# Patient Record
Sex: MALE | Race: WHITE | NOT HISPANIC OR LATINO | Employment: FULL TIME | URBAN - METROPOLITAN AREA
[De-identification: names, ages, dates, MRNs, and addresses within clinical notes are randomized per-mention and may not be internally consistent; named-entity substitution may affect disease eponyms.]

---

## 2019-06-28 ENCOUNTER — OFFICE VISIT (OUTPATIENT)
Dept: URGENT CARE | Facility: CLINIC | Age: 34
End: 2019-06-28
Payer: COMMERCIAL

## 2019-06-28 VITALS
WEIGHT: 187 LBS | HEART RATE: 120 BPM | DIASTOLIC BLOOD PRESSURE: 90 MMHG | RESPIRATION RATE: 18 BRPM | TEMPERATURE: 97.1 F | OXYGEN SATURATION: 98 % | SYSTOLIC BLOOD PRESSURE: 142 MMHG

## 2019-06-28 DIAGNOSIS — B34.9 VIRAL SYNDROME: Primary | ICD-10-CM

## 2019-06-28 PROCEDURE — 99203 OFFICE O/P NEW LOW 30 MIN: CPT | Performed by: EMERGENCY MEDICINE

## 2019-06-28 RX ORDER — ONDANSETRON 4 MG/1
4 TABLET, FILM COATED ORAL EVERY 8 HOURS PRN
Qty: 12 TABLET | Refills: 0 | Status: SHIPPED | OUTPATIENT
Start: 2019-06-28

## 2019-06-28 RX ORDER — LORAZEPAM 0.5 MG/1
TABLET ORAL
Refills: 1 | COMMUNITY
Start: 2019-05-20 | End: 2020-01-05 | Stop reason: HOSPADM

## 2019-06-28 RX ORDER — ONDANSETRON 4 MG/1
4 TABLET, ORALLY DISINTEGRATING ORAL EVERY 6 HOURS PRN
Status: DISCONTINUED | OUTPATIENT
Start: 2019-06-28 | End: 2020-01-05 | Stop reason: HOSPADM

## 2020-01-04 ENCOUNTER — APPOINTMENT (EMERGENCY)
Dept: RADIOLOGY | Facility: HOSPITAL | Age: 35
DRG: 392 | End: 2020-01-04
Payer: COMMERCIAL

## 2020-01-04 ENCOUNTER — HOSPITAL ENCOUNTER (INPATIENT)
Facility: HOSPITAL | Age: 35
LOS: 1 days | Discharge: HOME | DRG: 392 | End: 2020-01-05
Attending: EMERGENCY MEDICINE | Admitting: INTERNAL MEDICINE
Payer: COMMERCIAL

## 2020-01-04 ENCOUNTER — APPOINTMENT (EMERGENCY)
Dept: CT IMAGING | Facility: HOSPITAL | Age: 35
DRG: 392 | End: 2020-01-04
Payer: COMMERCIAL

## 2020-01-04 DIAGNOSIS — R10.32 LEFT LOWER QUADRANT ABDOMINAL PAIN: ICD-10-CM

## 2020-01-04 DIAGNOSIS — F10.230 ALCOHOL WITHDRAWAL SYNDROME WITHOUT COMPLICATION (HCC): Primary | ICD-10-CM

## 2020-01-04 DIAGNOSIS — K52.9 COLITIS: ICD-10-CM

## 2020-01-04 DIAGNOSIS — R11.0 NAUSEA: ICD-10-CM

## 2020-01-04 DIAGNOSIS — R79.89 ELEVATED LACTIC ACID LEVEL: ICD-10-CM

## 2020-01-04 DIAGNOSIS — K21.9 GERD (GASTROESOPHAGEAL REFLUX DISEASE): ICD-10-CM

## 2020-01-04 PROBLEM — F10.129 ALCOHOL ABUSE WITH INTOXICATION (HCC): Status: ACTIVE | Noted: 2020-01-04

## 2020-01-04 LAB
ALBUMIN SERPL BCP-MCNC: 3.7 G/DL (ref 3.5–5)
ALP SERPL-CCNC: 64 U/L (ref 46–116)
ALT SERPL W P-5'-P-CCNC: 48 U/L (ref 12–78)
AMPHETAMINES SERPL QL SCN: NEGATIVE
ANION GAP SERPL CALCULATED.3IONS-SCNC: 11 MMOL/L (ref 4–13)
AST SERPL W P-5'-P-CCNC: 42 U/L (ref 5–45)
BARBITURATES UR QL: NEGATIVE
BASOPHILS # BLD AUTO: 0.06 THOUSANDS/ΜL (ref 0–0.1)
BASOPHILS NFR BLD AUTO: 1 % (ref 0–1)
BENZODIAZ UR QL: NEGATIVE
BILIRUB SERPL-MCNC: 0.7 MG/DL (ref 0.2–1)
BILIRUB UR QL STRIP: NEGATIVE
BUN SERPL-MCNC: 10 MG/DL (ref 5–25)
CALCIUM SERPL-MCNC: 7.7 MG/DL (ref 8.3–10.1)
CHLORIDE SERPL-SCNC: 105 MMOL/L (ref 100–108)
CK MB SERPL-MCNC: 1.5 NG/ML (ref 0–5)
CK MB SERPL-MCNC: <1 % (ref 0–2.5)
CK SERPL-CCNC: 361 U/L (ref 39–308)
CLARITY UR: CLEAR
CO2 SERPL-SCNC: 27 MMOL/L (ref 21–32)
COCAINE UR QL: NEGATIVE
COLOR UR: YELLOW
CREAT SERPL-MCNC: 1.08 MG/DL (ref 0.6–1.3)
EOSINOPHIL # BLD AUTO: 0.07 THOUSAND/ΜL (ref 0–0.61)
EOSINOPHIL NFR BLD AUTO: 1 % (ref 0–6)
ERYTHROCYTE [DISTWIDTH] IN BLOOD BY AUTOMATED COUNT: 12.1 % (ref 11.6–15.1)
ETHANOL SERPL-MCNC: 371 MG/DL (ref 0–3)
GFR SERPL CREATININE-BSD FRML MDRD: 90 ML/MIN/1.73SQ M
GLUCOSE SERPL-MCNC: 95 MG/DL (ref 65–140)
GLUCOSE UR STRIP-MCNC: NEGATIVE MG/DL
HCT VFR BLD AUTO: 46.9 % (ref 36.5–49.3)
HGB BLD-MCNC: 16.9 G/DL (ref 12–17)
HGB UR QL STRIP.AUTO: NEGATIVE
IMM GRANULOCYTES # BLD AUTO: 0.02 THOUSAND/UL (ref 0–0.2)
IMM GRANULOCYTES NFR BLD AUTO: 0 % (ref 0–2)
INR PPP: 0.99 (ref 0.84–1.19)
KETONES UR STRIP-MCNC: ABNORMAL MG/DL
LACTATE SERPL-SCNC: 2.4 MMOL/L (ref 0.5–2)
LEUKOCYTE ESTERASE UR QL STRIP: NEGATIVE
LIPASE SERPL-CCNC: 243 U/L (ref 73–393)
LYMPHOCYTES # BLD AUTO: 2.74 THOUSANDS/ΜL (ref 0.6–4.47)
LYMPHOCYTES NFR BLD AUTO: 38 % (ref 14–44)
MAGNESIUM SERPL-MCNC: 2 MG/DL (ref 1.6–2.6)
MCH RBC QN AUTO: 31.6 PG (ref 26.8–34.3)
MCHC RBC AUTO-ENTMCNC: 36 G/DL (ref 31.4–37.4)
MCV RBC AUTO: 88 FL (ref 82–98)
METHADONE UR QL: NEGATIVE
MONOCYTES # BLD AUTO: 0.59 THOUSAND/ΜL (ref 0.17–1.22)
MONOCYTES NFR BLD AUTO: 8 % (ref 4–12)
NEUTROPHILS # BLD AUTO: 3.67 THOUSANDS/ΜL (ref 1.85–7.62)
NEUTS SEG NFR BLD AUTO: 52 % (ref 43–75)
NITRITE UR QL STRIP: NEGATIVE
NRBC BLD AUTO-RTO: 0 /100 WBCS
OPIATES UR QL SCN: NEGATIVE
PCP UR QL: NEGATIVE
PH UR STRIP.AUTO: 8 [PH]
PLATELET # BLD AUTO: 214 THOUSANDS/UL (ref 149–390)
PMV BLD AUTO: 9.2 FL (ref 8.9–12.7)
POTASSIUM SERPL-SCNC: 3.8 MMOL/L (ref 3.5–5.3)
PROT SERPL-MCNC: 7.6 G/DL (ref 6.4–8.2)
PROT UR STRIP-MCNC: NEGATIVE MG/DL
PROTHROMBIN TIME: 13.1 SECONDS (ref 11.6–14.5)
RBC # BLD AUTO: 5.34 MILLION/UL (ref 3.88–5.62)
SODIUM SERPL-SCNC: 143 MMOL/L (ref 136–145)
SP GR UR STRIP.AUTO: 1.01 (ref 1–1.03)
THC UR QL: NEGATIVE
TROPONIN I SERPL-MCNC: <0.02 NG/ML
TROPONIN I SERPL-MCNC: <0.02 NG/ML
TSH SERPL DL<=0.05 MIU/L-ACNC: 1.64 UIU/ML (ref 0.36–3.74)
UROBILINOGEN UR QL STRIP.AUTO: 0.2 E.U./DL
WBC # BLD AUTO: 7.15 THOUSAND/UL (ref 4.31–10.16)

## 2020-01-04 PROCEDURE — 74177 CT ABD & PELVIS W/CONTRAST: CPT

## 2020-01-04 PROCEDURE — 84443 ASSAY THYROID STIM HORMONE: CPT | Performed by: EMERGENCY MEDICINE

## 2020-01-04 PROCEDURE — 71046 X-RAY EXAM CHEST 2 VIEWS: CPT

## 2020-01-04 PROCEDURE — 96367 TX/PROPH/DG ADDL SEQ IV INF: CPT

## 2020-01-04 PROCEDURE — 36415 COLL VENOUS BLD VENIPUNCTURE: CPT | Performed by: EMERGENCY MEDICINE

## 2020-01-04 PROCEDURE — 81003 URINALYSIS AUTO W/O SCOPE: CPT | Performed by: EMERGENCY MEDICINE

## 2020-01-04 PROCEDURE — 83690 ASSAY OF LIPASE: CPT | Performed by: EMERGENCY MEDICINE

## 2020-01-04 PROCEDURE — 84484 ASSAY OF TROPONIN QUANT: CPT | Performed by: EMERGENCY MEDICINE

## 2020-01-04 PROCEDURE — C9113 INJ PANTOPRAZOLE SODIUM, VIA: HCPCS | Performed by: EMERGENCY MEDICINE

## 2020-01-04 PROCEDURE — 82550 ASSAY OF CK (CPK): CPT | Performed by: EMERGENCY MEDICINE

## 2020-01-04 PROCEDURE — 80307 DRUG TEST PRSMV CHEM ANLYZR: CPT | Performed by: EMERGENCY MEDICINE

## 2020-01-04 PROCEDURE — 99285 EMERGENCY DEPT VISIT HI MDM: CPT | Performed by: EMERGENCY MEDICINE

## 2020-01-04 PROCEDURE — 82553 CREATINE MB FRACTION: CPT | Performed by: EMERGENCY MEDICINE

## 2020-01-04 PROCEDURE — 80053 COMPREHEN METABOLIC PANEL: CPT | Performed by: EMERGENCY MEDICINE

## 2020-01-04 PROCEDURE — 80320 DRUG SCREEN QUANTALCOHOLS: CPT | Performed by: EMERGENCY MEDICINE

## 2020-01-04 PROCEDURE — 96365 THER/PROPH/DIAG IV INF INIT: CPT

## 2020-01-04 PROCEDURE — 96375 TX/PRO/DX INJ NEW DRUG ADDON: CPT

## 2020-01-04 PROCEDURE — 85610 PROTHROMBIN TIME: CPT | Performed by: EMERGENCY MEDICINE

## 2020-01-04 PROCEDURE — 83605 ASSAY OF LACTIC ACID: CPT | Performed by: EMERGENCY MEDICINE

## 2020-01-04 PROCEDURE — G0480 DRUG TEST DEF 1-7 CLASSES: HCPCS | Performed by: EMERGENCY MEDICINE

## 2020-01-04 PROCEDURE — 96368 THER/DIAG CONCURRENT INF: CPT

## 2020-01-04 PROCEDURE — 85025 COMPLETE CBC W/AUTO DIFF WBC: CPT | Performed by: EMERGENCY MEDICINE

## 2020-01-04 PROCEDURE — 99285 EMERGENCY DEPT VISIT HI MDM: CPT

## 2020-01-04 PROCEDURE — 93005 ELECTROCARDIOGRAM TRACING: CPT

## 2020-01-04 PROCEDURE — 83735 ASSAY OF MAGNESIUM: CPT | Performed by: EMERGENCY MEDICINE

## 2020-01-04 PROCEDURE — 99223 1ST HOSP IP/OBS HIGH 75: CPT | Performed by: NURSE PRACTITIONER

## 2020-01-04 RX ORDER — SODIUM CHLORIDE 9 MG/ML
75 INJECTION, SOLUTION INTRAVENOUS CONTINUOUS
Status: DISCONTINUED | OUTPATIENT
Start: 2020-01-04 | End: 2020-01-05 | Stop reason: HOSPADM

## 2020-01-04 RX ORDER — NICOTINE 21 MG/24HR
14 PATCH, TRANSDERMAL 24 HOURS TRANSDERMAL ONCE
Status: DISCONTINUED | OUTPATIENT
Start: 2020-01-04 | End: 2020-01-05 | Stop reason: HOSPADM

## 2020-01-04 RX ORDER — MAGNESIUM HYDROXIDE/ALUMINUM HYDROXICE/SIMETHICONE 120; 1200; 1200 MG/30ML; MG/30ML; MG/30ML
30 SUSPENSION ORAL EVERY 6 HOURS PRN
Status: DISCONTINUED | OUTPATIENT
Start: 2020-01-04 | End: 2020-01-05 | Stop reason: HOSPADM

## 2020-01-04 RX ORDER — PANTOPRAZOLE SODIUM 40 MG/1
40 INJECTION, POWDER, FOR SOLUTION INTRAVENOUS ONCE
Status: COMPLETED | OUTPATIENT
Start: 2020-01-04 | End: 2020-01-04

## 2020-01-04 RX ORDER — FOLIC ACID 1 MG/1
1 TABLET ORAL DAILY
Status: DISCONTINUED | OUTPATIENT
Start: 2020-01-05 | End: 2020-01-05 | Stop reason: HOSPADM

## 2020-01-04 RX ORDER — NICOTINE 21 MG/24HR
1 PATCH, TRANSDERMAL 24 HOURS TRANSDERMAL DAILY
Status: DISCONTINUED | OUTPATIENT
Start: 2020-01-05 | End: 2020-01-05 | Stop reason: HOSPADM

## 2020-01-04 RX ORDER — SODIUM CHLORIDE 9 MG/ML
100 INJECTION, SOLUTION INTRAVENOUS CONTINUOUS
Status: DISCONTINUED | OUTPATIENT
Start: 2020-01-04 | End: 2020-01-05 | Stop reason: HOSPADM

## 2020-01-04 RX ORDER — PANTOPRAZOLE SODIUM 40 MG/1
40 INJECTION, POWDER, FOR SOLUTION INTRAVENOUS ONCE
Status: COMPLETED | OUTPATIENT
Start: 2020-01-04 | End: 2020-01-05

## 2020-01-04 RX ORDER — MAGNESIUM SULFATE HEPTAHYDRATE 40 MG/ML
2 INJECTION, SOLUTION INTRAVENOUS ONCE
Status: COMPLETED | OUTPATIENT
Start: 2020-01-04 | End: 2020-01-04

## 2020-01-04 RX ORDER — ONDANSETRON 2 MG/ML
4 INJECTION INTRAMUSCULAR; INTRAVENOUS ONCE AS NEEDED
Status: DISCONTINUED | OUTPATIENT
Start: 2020-01-04 | End: 2020-01-04 | Stop reason: SDUPTHER

## 2020-01-04 RX ORDER — SODIUM CHLORIDE 9 MG/ML
3 INJECTION INTRAVENOUS AS NEEDED
Status: DISCONTINUED | OUTPATIENT
Start: 2020-01-04 | End: 2020-01-05 | Stop reason: HOSPADM

## 2020-01-04 RX ORDER — LORAZEPAM 2 MG/ML
1 INJECTION INTRAMUSCULAR EVERY 4 HOURS PRN
Status: DISCONTINUED | OUTPATIENT
Start: 2020-01-04 | End: 2020-01-05 | Stop reason: HOSPADM

## 2020-01-04 RX ORDER — ONDANSETRON 2 MG/ML
4 INJECTION INTRAMUSCULAR; INTRAVENOUS ONCE AS NEEDED
Status: COMPLETED | OUTPATIENT
Start: 2020-01-04 | End: 2020-01-04

## 2020-01-04 RX ORDER — ACETAMINOPHEN 325 MG/1
650 TABLET ORAL EVERY 6 HOURS PRN
Status: DISCONTINUED | OUTPATIENT
Start: 2020-01-04 | End: 2020-01-05 | Stop reason: HOSPADM

## 2020-01-04 RX ORDER — MORPHINE SULFATE 4 MG/ML
4 INJECTION, SOLUTION INTRAMUSCULAR; INTRAVENOUS ONCE
Status: COMPLETED | OUTPATIENT
Start: 2020-01-04 | End: 2020-01-04

## 2020-01-04 RX ORDER — ONDANSETRON 2 MG/ML
4 INJECTION INTRAMUSCULAR; INTRAVENOUS EVERY 6 HOURS PRN
Status: DISCONTINUED | OUTPATIENT
Start: 2020-01-04 | End: 2020-01-05 | Stop reason: HOSPADM

## 2020-01-04 RX ORDER — PANTOPRAZOLE SODIUM 40 MG/1
40 TABLET, DELAYED RELEASE ORAL
Status: DISCONTINUED | OUTPATIENT
Start: 2020-01-05 | End: 2020-01-05

## 2020-01-04 RX ORDER — THIAMINE MONONITRATE (VIT B1) 100 MG
100 TABLET ORAL DAILY
Status: DISCONTINUED | OUTPATIENT
Start: 2020-01-05 | End: 2020-01-05 | Stop reason: HOSPADM

## 2020-01-04 RX ADMIN — ONDANSETRON 4 MG: 2 INJECTION INTRAMUSCULAR; INTRAVENOUS at 21:04

## 2020-01-04 RX ADMIN — THIAMINE HYDROCHLORIDE 100 MG: 100 INJECTION, SOLUTION INTRAMUSCULAR; INTRAVENOUS at 19:26

## 2020-01-04 RX ADMIN — IOHEXOL 100 ML: 350 INJECTION, SOLUTION INTRAVENOUS at 20:08

## 2020-01-04 RX ADMIN — LORAZEPAM 1 MG: 2 INJECTION INTRAMUSCULAR; INTRAVENOUS at 20:54

## 2020-01-04 RX ADMIN — DEXTROSE 500 ML: 5 SOLUTION INTRAVENOUS at 19:26

## 2020-01-04 RX ADMIN — PANTOPRAZOLE SODIUM 40 MG: 40 INJECTION, POWDER, FOR SOLUTION INTRAVENOUS at 19:22

## 2020-01-04 RX ADMIN — MAGNESIUM SULFATE HEPTAHYDRATE 2 G: 40 INJECTION, SOLUTION INTRAVENOUS at 20:23

## 2020-01-04 RX ADMIN — FOLIC ACID 1 MG: 5 INJECTION, SOLUTION INTRAMUSCULAR; INTRAVENOUS; SUBCUTANEOUS at 19:25

## 2020-01-04 RX ADMIN — MORPHINE SULFATE 4 MG: 4 INJECTION INTRAVENOUS at 19:24

## 2020-01-04 RX ADMIN — SODIUM CHLORIDE 1000 ML: 0.9 INJECTION, SOLUTION INTRAVENOUS at 20:31

## 2020-01-04 RX ADMIN — NICOTINE 14 MG: 14 PATCH TRANSDERMAL at 21:45

## 2020-01-04 NOTE — ED PROVIDER NOTES
History  Chief Complaint   Patient presents with    Abdominal Pain     pt was drinking this day  pt with history of drinking issue  pt is with abdominal pain and discomfort  Pt does follow with GI and was last there in Nov     36year-old male presents with 1 week of intermittent lower abdominal pain and melanotic stools and 3-4 days of nausea and intermittent nonbloody nonbilious emesis and generalized weakness  Today patient states he feels quite anxious and has palpitations  Patient states that he is not a daily drinker but has periods of binge drinking which has been going on for the last 7 days  He states that when he starts dating someone that that is his trigger for drinking  Patient admits to drinking 1-2 pt of [place] daily last drink was today at 4:00 p m  Irl Genesisa Patient's neighbor and POA is at bedside, [facility] cell [phone]  Mr Vin Pavon states that he went by patient's house this afternoon patient was very inebriated  Mr Vin Pavon says that patient was in [place] in November 2019 for GI bleed, patient had EGD which showed esophageal erosion but no varices  Patient denies suicidal ideation or hallucinations  CIWA score 19  He has never had alcohol withdrawal seizures or ICU admission  Patient does not want alcohol rehabilitation because he fears losing his job as he is a   Of note, Mr Vin Pavon states that patient fell two days ago in a drunken stupor while in shower and sustained large bruise to left lateral flank        History provided by:  Patient and friend   used: No    Withdrawal - Alcohol   Similar prior episodes: yes    Severity:  Severe  Onset quality:  Gradual  Duration:  2 hours  Timing:  Constant  Progression:  Worsening  Chronicity:  Recurrent  Suspected agents:  Alcohol  Associated symptoms: abdominal pain, headaches, nausea, palpitations and vomiting    Associated symptoms: no agitation, no blackouts, no bladder incontinence, no bowel incontinence, no confusion, no hallucinations, no loss of consciousness, no seizures, no shortness of breath, no somnolence, no suicidal ideation, no violence and no weakness    Abdominal pain:     Location:  Generalized    Quality: cramping      Severity:  Mild    Onset quality:  Gradual    Duration:  1 week    Timing:  Intermittent    Progression:  Waxing and waning    Chronicity:  Recurrent  Risk factors: withdrawal syndrome        Prior to Admission Medications   Prescriptions Last Dose Informant Patient Reported? Taking? 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    Known health problems: none        Past Surgical History:   Procedure Laterality Date    KNEE ARTHROSCOPY         Family History   Problem Relation Age of Onset    Dementia Mother     Pulmonary embolism Father      I have reviewed and agree with the history as documented  Social History     Tobacco Use    Smoking status: Never Smoker    Smokeless tobacco: Current User     Types: Chew   Substance Use Topics    Alcohol use: Yes     Comment: daily    Drug use: Never        Review of Systems   Constitutional: Positive for diaphoresis  Negative for chills, fatigue and fever  HENT: Negative for congestion, drooling, facial swelling, nosebleeds, sneezing, trouble swallowing and voice change  Eyes: Negative for photophobia, pain and visual disturbance     Respiratory: Positive for chest tightness  Negative for cough, shortness of breath and wheezing  Cardiovascular: Positive for palpitations  Negative for chest pain and leg swelling  Gastrointestinal: Positive for abdominal pain, blood in stool, nausea and vomiting  Negative for abdominal distention, anal bleeding, bowel incontinence, constipation, diarrhea and rectal pain  Genitourinary: Negative for bladder incontinence, decreased urine volume, difficulty urinating, discharge, dysuria, flank pain, frequency, hematuria, penile pain, penile swelling, scrotal swelling, testicular pain and urgency  Musculoskeletal: Negative for arthralgias, back pain, myalgias, neck pain and neck stiffness  Skin: Negative for color change, pallor, rash and wound  Allergic/Immunologic: Negative for immunocompromised state  Neurological: Positive for headaches  Negative for dizziness, tremors, seizures, loss of consciousness, syncope, facial asymmetry, speech difficulty, weakness, light-headedness and numbness  Hematological: Negative for adenopathy  Psychiatric/Behavioral: Negative for agitation, confusion, decreased concentration, dysphoric mood, hallucinations, self-injury, sleep disturbance and suicidal ideas  The patient is nervous/anxious  The patient is not hyperactive  Physical Exam  Physical Exam   Constitutional: He is oriented to person, place, and time  He appears well-developed and well-nourished  He is cooperative  Non-toxic appearance  He does not have a sickly appearance  He does not appear ill  No distress  HENT:   Head: Normocephalic and atraumatic  Right Ear: Hearing, tympanic membrane, external ear and ear canal normal    Left Ear: Hearing, tympanic membrane, external ear and ear canal normal    Nose: Nose normal  Right sinus exhibits no maxillary sinus tenderness and no frontal sinus tenderness  Left sinus exhibits no maxillary sinus tenderness and no frontal sinus tenderness     Mouth/Throat: Uvula is midline, oropharynx is clear and moist and mucous membranes are normal  No oropharyngeal exudate, posterior oropharyngeal edema, posterior oropharyngeal erythema or tonsillar abscesses  Eyes: Pupils are equal, round, and reactive to light  Conjunctivae, EOM and lids are normal  No scleral icterus  Neck: Trachea normal, normal range of motion, full passive range of motion without pain and phonation normal  Neck supple  No thyroid mass and no thyromegaly present  Cardiovascular: Regular rhythm, S1 normal, S2 normal, normal heart sounds, intact distal pulses and normal pulses  Pulses:       Radial pulses are 2+ on the right side, and 2+ on the left side  Dorsalis pedis pulses are 2+ on the right side, and 2+ on the left side  Pulmonary/Chest: Effort normal and breath sounds normal  No accessory muscle usage or stridor  No tachypnea  No respiratory distress  He has no decreased breath sounds  He has no wheezes  He has no rhonchi  He has no rales  He exhibits no mass, no tenderness, no deformity and no retraction  Abdominal: Soft  Normal appearance and bowel sounds are normal  He exhibits no distension, no ascites and no mass  There is no hepatosplenomegaly  There is tenderness in the suprapubic area and left lower quadrant  There is no rigidity, no rebound, no guarding, no CVA tenderness, no tenderness at McBurney's point and negative Diggs's sign  No hernia  Hernia confirmed negative in the right inguinal area and confirmed negative in the left inguinal area  Area of ecchymosis on left lateral flank   Genitourinary: Testes normal and penis normal  Cremasteric reflex is present  Right testis shows no mass, no swelling and no tenderness  Left testis shows no mass, no swelling and no tenderness  Circumcised  No penile tenderness  Musculoskeletal: Normal range of motion  He exhibits no edema, tenderness or deformity  Lymphadenopathy:     He has no cervical adenopathy   No inguinal adenopathy noted on the right or left side  Neurological: He is alert and oriented to person, place, and time  He has normal strength  He is not disoriented  He displays no atrophy and no tremor  No cranial nerve deficit or sensory deficit  He exhibits normal muscle tone  He displays a negative Romberg sign  He displays no seizure activity  Coordination and gait normal  GCS eye subscore is 4  GCS verbal subscore is 5  GCS motor subscore is 6  Patient is AAOx4, GCS 15; speaking clearly and appropriately; motor and sensation intact; visual fields intact; cranial nerves II-XII grossly intact; no facial droop, slurred speech or arm drift   Skin: Skin is warm and intact  Capillary refill takes less than 2 seconds  Ecchymosis (Left flank) noted  No abrasion, no bruising, no burn, no laceration, no lesion, no petechiae and no rash noted  Rash is not maculopapular  He is diaphoretic  No cyanosis or erythema  No pallor  Psychiatric: His speech is normal and behavior is normal  Judgment and thought content normal  His mood appears anxious  He is not actively hallucinating  Cognition and memory are normal  He is attentive  Nursing note and vitals reviewed        Vital Signs  ED Triage Vitals   Temp Pulse Respirations Blood Pressure SpO2   -- 01/04/20 1812 01/04/20 1812 01/04/20 1812 01/04/20 1812    (!) 107 16 138/96 97 %      Temp src Heart Rate Source Patient Position - Orthostatic VS BP Location FiO2 (%)   -- 01/04/20 1830 01/04/20 1830 01/04/20 1830 --    Monitor Lying Right arm       Pain Score       --                  Vitals:    01/04/20 1900 01/04/20 1930 01/04/20 2015 01/04/20 2100   BP: 135/99 136/97 133/92 136/94   Pulse: 98 (!) 111 97 100   Patient Position - Orthostatic VS: Lying Lying Sitting Lying         Visual Acuity      ED Medications  Medications   sodium chloride (PF) 0 9 % injection 3 mL (has no administration in time range)   magnesium sulfate 2 g/50 mL IVPB (premix) 2 g (2 g Intravenous New Bag 1/4/20 2023) LORazepam (ATIVAN) 2 mg/mL injection 1 mg (1 mg Intravenous Given 1/4/20 2054)   nicotine (NICODERM CQ) 14 mg/24hr TD 24 hr patch 14 mg (14 mg Transdermal Medication Applied 1/4/20 2145)   morphine (PF) 4 mg/mL injection 4 mg (4 mg Intravenous Given 1/4/20 1924)   dextrose 5 % bolus 500 mL (0 mL Intravenous Stopped 1/4/20 2030)   thiamine (VITAMIN B1) 100 mg in sodium chloride 0 9 % 50 mL IVPB (0 mg Intravenous Stopped 6/6/96 4935)   folic acid 1 mg in sodium chloride 0 9 % 50 mL IVPB (0 mg Intravenous Stopped 1/4/20 1950)   pantoprazole (PROTONIX) injection 40 mg (40 mg Intravenous Given 1/4/20 1922)   iohexol (OMNIPAQUE) 350 MG/ML injection (SINGLE-DOSE) 100 mL (100 mL Intravenous Given 1/4/20 2008)   sodium chloride 0 9 % bolus 1,000 mL (0 mL Intravenous Stopped 1/4/20 2145)   ondansetron (ZOFRAN) injection 4 mg (4 mg Intravenous Given 1/4/20 2104)       Diagnostic Studies  Results Reviewed     Procedure Component Value Units Date/Time    Troponin I repeat in 3 hrs [[id number]]  (Normal) Collected:  01/04/20 2127    Lab Status:  Final result Specimen:  Blood from Arm, Left Updated:  01/04/20 2152     Troponin I <0 02 ng/mL     Rapid drug screen, urine [[id number]]  (Normal) Collected:  01/04/20 1932    Lab Status:  Final result Specimen:  Urine, Clean Catch Updated:  01/04/20 2148     Amph/Meth UR Negative     Barbiturate Ur Negative     Benzodiazepine Urine Negative     Cocaine Urine Negative     Methadone Urine Negative     Opiate Urine Negative     PCP Ur Negative     THC Urine Negative    Narrative:       FOR MEDICAL PURPOSES ONLY  IF CONFIRMATION NEEDED PLEASE CONTACT THE LAB WITHIN 5 DAYS      Drug Screen Cutoff Levels:  AMPHETAMINE/METHAMPHETAMINES  1000 ng/mL  BARBITURATES     200 ng/mL  BENZODIAZEPINES     200 ng/mL  COCAINE      300 ng/mL  METHADONE      300 ng/mL  OPIATES      300 ng/mL  PHENCYCLIDINE     25 ng/mL  THC       50 ng/mL      CKMB [[id number]]  (Normal) Collected:  01/04/20 1905    Lab Status:  Final result Specimen:  Blood from Arm, Right Updated:  01/04/20 2000     CK-MB Index <1 0 %      CK-MB 1 5 ng/mL     TSH, 3rd generation [[id number]]  (Normal) Collected:  01/04/20 1905    Lab Status:  Final result Specimen:  Blood from Arm, Right Updated:  01/04/20 1958     TSH 3RD GENERATON 1 642 uIU/mL     Narrative:       Patients undergoing fluorescein dye angiography may retain small amounts of fluorescein in the body for 48-72 hours post procedure  Samples containing fluorescein can produce falsely depressed TSH values  If the patient had this procedure,a specimen should be resubmitted post fluorescein clearance  Lactic acid, plasma [[id number]]  (Abnormal) Collected:  01/04/20 1905    Lab Status:  Final result Specimen:  Blood from Arm, Right Updated:  01/04/20 1946     LACTIC ACID 2 4 mmol/L     Narrative:       Result may be elevated if tourniquet was used during collection      Comprehensive metabolic panel [[id number]]  (Abnormal) Collected:  01/04/20 1905    Lab Status:  Final result Specimen:  Blood from Arm, Right Updated:  01/04/20 1943     Sodium 143 mmol/L      Potassium 3 8 mmol/L      Chloride 105 mmol/L      CO2 27 mmol/L      ANION GAP 11 mmol/L      BUN 10 mg/dL      Creatinine 1 08 mg/dL      Glucose 95 mg/dL      Calcium 7 7 mg/dL      AST 42 U/L      ALT 48 U/L      Alkaline Phosphatase 64 U/L      Total Protein 7 6 g/dL      Albumin 3 7 g/dL      Total Bilirubin 0 70 mg/dL      eGFR 90 ml/min/1 73sq m     Narrative:       [facility] guidelines for Chronic Kidney Disease (CKD):     Stage 1 with normal or high GFR (GFR > 90 mL/min/1 73 square meters)    Stage 2 Mild CKD (GFR = 60-89 mL/min/1 73 square meters)    Stage 3A Moderate CKD (GFR = 45-59 mL/min/1 73 square meters)    Stage 3B Moderate CKD (GFR = 30-44 mL/min/1 73 square meters)    Stage 4 Severe CKD (GFR = 15-29 mL/min/1 73 square meters)    Stage 5 End Stage CKD (GFR <15 mL/min/1 73 square meters)  Note: GFR calculation is accurate only with a steady state creatinine    Lipase [[id number]]  (Normal) Collected:  01/04/20 1905    Lab Status:  Final result Specimen:  Blood from Arm, Right Updated:  01/04/20 1943     Lipase 243 u/L     Magnesium [[id number]]  (Normal) Collected:  01/04/20 1905    Lab Status:  Final result Specimen:  Blood from Arm, Right Updated:  01/04/20 1943     Magnesium 2 0 mg/dL     UA w Reflex to Microscopic w Reflex to Culture [[id number]]  (Abnormal) Collected:  01/04/20 1932    Lab Status:  Final result Specimen:  Urine, Clean Catch Updated:  01/04/20 1938     Color, UA Yellow     Clarity, UA Clear     Specific Gravity, UA 1 015     pH, UA 8 0     Leukocytes, UA Negative     Nitrite, UA Negative     Protein, UA Negative mg/dl      Glucose, UA Negative mg/dl      Ketones, UA 15 (1+) mg/dl      Urobilinogen, UA 0 2 E U /dl      Bilirubin, UA Negative     Blood, UA Negative    Troponin I [[id number]]  (Normal) Collected:  01/04/20 1905    Lab Status:  Final result Specimen:  Blood from Arm, Right Updated:  01/04/20 1936     Troponin I <0 02 ng/mL     CK (with reflex to MB) [[id number]]  (Abnormal) Collected:  01/04/20 1905    Lab Status:  Final result Specimen:  Blood from Arm, Right Updated:  01/04/20 1936     Total  U/L     Ethanol [[id number]]  (Abnormal) Collected:  01/04/20 1905    Lab Status:  Final result Specimen:  Blood from Arm, Right Updated:  01/04/20 1931     Ethanol Lvl 371 mg/dL     Protime-INR [[id number]]  (Normal) Collected:  01/04/20 1905    Lab Status:  Final result Specimen:  Blood from Arm, Right Updated:  01/04/20 1925     Protime 13 1 seconds      INR 0 99    CBC and differential [[id number]] Collected:  01/04/20 1905    Lab Status:  Final result Specimen:  Blood from Arm, Right Updated:  01/04/20 1911     WBC 7 15 Thousand/uL      RBC 5 34 Million/uL      Hemoglobin 16 9 g/dL      Hematocrit 46 9 %      MCV 88 fL      MCH 31 6 pg      MCHC 36 0 g/dL      RDW 12 1 %      MPV 9 2 fL      Platelets 822 Thousands/uL      nRBC 0 /100 WBCs      Neutrophils Relative 52 %      Immat GRANS % 0 %      Lymphocytes Relative 38 %      Monocytes Relative 8 %      Eosinophils Relative 1 %      Basophils Relative 1 %      Neutrophils Absolute 3 67 Thousands/µL      Immature Grans Absolute 0 02 Thousand/uL      Lymphocytes Absolute 2 74 Thousands/µL      Monocytes Absolute 0 59 Thousand/µL      Eosinophils Absolute 0 07 Thousand/µL      Basophils Absolute 0 06 Thousands/µL                  CT abdomen pelvis with contrast   Final Result by Lacey Mendiola MD (01/04 2024)      Diffuse mild thickening of the ascending, proximal transverse, and rectosigmoid colon which may be due to nonspecific colitis versus underdistention  Workstation performed: [id number]         X-ray chest 2 views    (Results Pending)              Procedures  ECG 12 Lead Documentation Only  Date/Time: 1/4/2020 7:13 PM  Performed by: Paz Acosta MD  Authorized by: Paz Acosta MD     Indications / Diagnosis:  Dizziness  ECG reviewed by me, the ED Provider: yes    Patient location:  ED  Previous ECG:     Comparison to cardiac monitor: Yes    Interpretation:     Interpretation: normal    Rate:     ECG rate:  100bpm    ECG rate assessment: normal    Rhythm:     Rhythm: sinus rhythm    Ectopy:     Ectopy: none    QRS:     QRS axis:  Normal  Conduction:     Conduction: normal    ST segments:     ST segments:  Normal  T waves:     T waves: flattening and inverted      Flattening: I    Inverted:  AVR and V1  Comments:      No STEMI  SC 150ms  QRS 90ms  QT 425ms             ED Course  ED Course as of Jan 04 2215   Sat Jan 04, 2020   2004 Ethanol 371 at 1905, will be clinically sober at 0800 tomorrow morning  2022 Chest x-ray read and interpreted by me  Negative for pneumothorax, mediastinal widening, focal consolidation or pleural effusion        2036 CTAP:IMPRESSION:     Diffuse mild thickening of the ascending, proximal transverse, and rectosigmoid colon which may be due to nonspecific colitis versus underdistention             2036 Texted Whitney Kothari PA-C for admission for alcohol withdrawal       2048 Whitney Kothari PA-C accepts patient to [facility] bed under Dr QUICK [facility] service  2048 Reassessed patient  He is very anxious at this time and has the hiccups  Will give Ativan 1 mg IV now  Informed him that he is admitted for alcohol withdrawal and he is agreeable to admission              MDM  Number of Diagnoses or Management Options     Amount and/or Complexity of Data Reviewed  Clinical lab tests: reviewed and ordered  Tests in the radiology section of CPT®: reviewed and ordered  Tests in the medicine section of CPT®: ordered and reviewed  Review and summarize past medical records: yes  Discuss the patient with other providers: yes  Independent visualization of images, tracings, or specimens: yes    Patient Progress  Patient progress: stable        Disposition  Final diagnoses:   Alcohol withdrawal syndrome without complication (HCC)   Nausea   Left lower quadrant abdominal pain   Colitis   Elevated lactic acid level     Time reflects when diagnosis was documented in both MDM as applicable and the Disposition within this note     Time User Action Codes Description Comment    1/4/2020  8:52 PM Dicie Garcia Add [F10 230] Alcohol withdrawal syndrome without complication ([facility] [address] )     3/4/9088  8:52 PM Dicie Garcia Add [R11 0] Nausea     1/4/2020  8:52 PM Dicie Garcia Add [R10 32] Left lower quadrant abdominal pain     1/4/2020  8:52 PM Dicie Garcia Add [K52 9] Colitis     1/4/2020  8:52 PM Dicie Garcia Add [R79 89] Elevated lactic acid level       ED Disposition     ED Disposition Condition Date/Time Comment    Admit Stable Sat Jan 4, 2020  8:52 PM Case was discussed with Whitney Kothari PA-C and the patient's admission status was agreed to be Admission Status: inpatient status to the service of Dr Yevgeniy Cuellar   Follow-up Information    None         Patient's Medications   Discharge Prescriptions    No medications on file     No discharge procedures on file      ED Provider  Electronically Signed by    MD Paz Cosme MD  01/04/20 1775       Paz Acosta MD  01/04/20 5890

## 2020-01-04 NOTE — ED NOTES
Patient complaints of sharp pains in the epigastric area  States pain is "random" with no associated symptoms  Patient states they do not believe anything aggravates the pain or relieves it        Renee Coreas RN  01/04/20 3041

## 2020-01-05 VITALS
DIASTOLIC BLOOD PRESSURE: 97 MMHG | HEART RATE: 100 BPM | WEIGHT: 215.39 LBS | BODY MASS INDEX: 26.78 KG/M2 | HEIGHT: 75 IN | SYSTOLIC BLOOD PRESSURE: 161 MMHG | RESPIRATION RATE: 16 BRPM | TEMPERATURE: 97.8 F | OXYGEN SATURATION: 97 %

## 2020-01-05 PROBLEM — Z87.19 HISTORY OF GI BLEED: Status: ACTIVE | Noted: 2020-01-05

## 2020-01-05 PROBLEM — K63.9 COLONIC THICKENING: Status: ACTIVE | Noted: 2020-01-05

## 2020-01-05 PROBLEM — F32.A ANXIETY AND DEPRESSION: Status: ACTIVE | Noted: 2020-01-05

## 2020-01-05 PROBLEM — K21.9 GERD (GASTROESOPHAGEAL REFLUX DISEASE): Status: ACTIVE | Noted: 2020-01-05

## 2020-01-05 PROBLEM — R10.30 LOWER ABDOMINAL PAIN: Status: ACTIVE | Noted: 2020-01-05

## 2020-01-05 PROBLEM — F41.9 ANXIETY AND DEPRESSION: Status: ACTIVE | Noted: 2020-01-05

## 2020-01-05 LAB
ANION GAP SERPL CALCULATED.3IONS-SCNC: 9 MMOL/L (ref 4–13)
BUN SERPL-MCNC: 8 MG/DL (ref 5–25)
CALCIUM SERPL-MCNC: 7.2 MG/DL (ref 8.3–10.1)
CHLORIDE SERPL-SCNC: 105 MMOL/L (ref 100–108)
CO2 SERPL-SCNC: 26 MMOL/L (ref 21–32)
CREAT SERPL-MCNC: 0.97 MG/DL (ref 0.6–1.3)
ETHANOL SERPL-MCNC: 15 MG/DL (ref 0–3)
ETHANOL SERPL-MCNC: 163 MG/DL (ref 0–3)
GFR SERPL CREATININE-BSD FRML MDRD: 102 ML/MIN/1.73SQ M
GLUCOSE SERPL-MCNC: 82 MG/DL (ref 65–140)
LACTATE SERPL-SCNC: 1.8 MMOL/L (ref 0.5–2)
POTASSIUM SERPL-SCNC: 3.8 MMOL/L (ref 3.5–5.3)
SODIUM SERPL-SCNC: 140 MMOL/L (ref 136–145)

## 2020-01-05 PROCEDURE — 99239 HOSP IP/OBS DSCHRG MGMT >30: CPT | Performed by: FAMILY MEDICINE

## 2020-01-05 PROCEDURE — C9113 INJ PANTOPRAZOLE SODIUM, VIA: HCPCS | Performed by: NURSE PRACTITIONER

## 2020-01-05 PROCEDURE — 80320 DRUG SCREEN QUANTALCOHOLS: CPT | Performed by: NURSE PRACTITIONER

## 2020-01-05 PROCEDURE — 80048 BASIC METABOLIC PNL TOTAL CA: CPT | Performed by: NURSE PRACTITIONER

## 2020-01-05 PROCEDURE — G0480 DRUG TEST DEF 1-7 CLASSES: HCPCS | Performed by: FAMILY MEDICINE

## 2020-01-05 PROCEDURE — 83605 ASSAY OF LACTIC ACID: CPT | Performed by: NURSE PRACTITIONER

## 2020-01-05 PROCEDURE — 80320 DRUG SCREEN QUANTALCOHOLS: CPT | Performed by: FAMILY MEDICINE

## 2020-01-05 PROCEDURE — 94760 N-INVAS EAR/PLS OXIMETRY 1: CPT

## 2020-01-05 PROCEDURE — G0480 DRUG TEST DEF 1-7 CLASSES: HCPCS | Performed by: NURSE PRACTITIONER

## 2020-01-05 RX ORDER — PANTOPRAZOLE SODIUM 40 MG/1
40 TABLET, DELAYED RELEASE ORAL
Status: DISCONTINUED | OUTPATIENT
Start: 2020-01-05 | End: 2020-01-05 | Stop reason: HOSPADM

## 2020-01-05 RX ORDER — OMEPRAZOLE 40 MG/1
40 CAPSULE, DELAYED RELEASE ORAL DAILY
Qty: 30 CAPSULE | Refills: 0 | Status: SHIPPED | OUTPATIENT
Start: 2020-01-05

## 2020-01-05 RX ADMIN — THIAMINE HCL TAB 100 MG 100 MG: 100 TAB at 08:49

## 2020-01-05 RX ADMIN — LORAZEPAM 1 MG: 2 INJECTION INTRAMUSCULAR; INTRAVENOUS at 09:05

## 2020-01-05 RX ADMIN — SODIUM CHLORIDE 100 ML/HR: 0.9 INJECTION, SOLUTION INTRAVENOUS at 00:41

## 2020-01-05 RX ADMIN — SODIUM CHLORIDE 100 ML/HR: 0.9 INJECTION, SOLUTION INTRAVENOUS at 09:16

## 2020-01-05 RX ADMIN — FOLIC ACID 1 MG: 1 TABLET ORAL at 08:49

## 2020-01-05 RX ADMIN — PANTOPRAZOLE SODIUM 40 MG: 40 TABLET, DELAYED RELEASE ORAL at 06:06

## 2020-01-05 RX ADMIN — ACETAMINOPHEN 650 MG: 325 TABLET ORAL at 08:49

## 2020-01-05 RX ADMIN — ONDANSETRON 4 MG: 2 INJECTION INTRAMUSCULAR; INTRAVENOUS at 08:49

## 2020-01-05 RX ADMIN — NICOTINE 1 PATCH: 21 PATCH TRANSDERMAL at 08:57

## 2020-01-05 RX ADMIN — Medication 1 TABLET: at 08:49

## 2020-01-05 RX ADMIN — PANTOPRAZOLE SODIUM 40 MG: 40 INJECTION, POWDER, FOR SOLUTION INTRAVENOUS at 00:41

## 2020-01-05 NOTE — DISCHARGE INSTRUCTIONS
Follow-up with the PCP as an outpatient  Discussed with the PCP regarding reinitiate eating on antianxiety agent  Collect primary care physician or come back to the emergency room if there is worsening of tremor/agitation/confusion/or seizures    Please return to the emergency room if you have any dark stools/blood in stools or throwing up blood/dizziness or lightheadedness  Follow-up with the outpatient resources for alcohol cessation  Drink plenty of fluids, avoid spicy and acidic foods/tobacco/coffee/alcohol

## 2020-01-05 NOTE — PLAN OF CARE
Problem: PAIN - ADULT  Goal: Verbalizes/displays adequate comfort level or baseline comfort level  Description  Interventions:  - Encourage patient to monitor pain and request assistance  - Assess pain using appropriate pain scale  - Administer analgesics based on type and severity of pain and evaluate response  - Implement non-pharmacological measures as appropriate and evaluate response  - Consider cultural and social influences on pain and pain management  - Notify physician/advanced practitioner if interventions unsuccessful or patient reports new pain  Outcome: Progressing     Problem: INFECTION - ADULT  Goal: Absence or prevention of progression during hospitalization  Description  INTERVENTIONS:  - Assess and monitor for signs and symptoms of infection  - Monitor lab/diagnostic results  - Monitor all insertion sites, i e  indwelling lines, tubes, and drains  - Monitor endotracheal if appropriate and nasal secretions for changes in amount and color  - [place] appropriate cooling/warming therapies per order  - Administer medications as ordered  - Instruct and encourage patient and family to use good hand hygiene technique  - Identify and instruct in appropriate isolation precautions for identified infection/condition  Outcome: Progressing  Goal: Absence of fever/infection during neutropenic period  Description  INTERVENTIONS:  - Monitor WBC    Outcome: Progressing     Problem: SAFETY ADULT  Goal: Patient will remain free of falls  Description  INTERVENTIONS:  - Assess patient frequently for physical needs  -  Identify cognitive and physical deficits and behaviors that affect risk of falls    -  [place] fall precautions as indicated by assessment   - Educate patient/family on patient safety including physical limitations  - Instruct patient to call for assistance with activity based on assessment  - Modify environment to reduce risk of injury  - Consider OT/PT consult to assist with strengthening/mobility  Outcome: Progressing  Goal: Maintain or return to baseline ADL function  Description  INTERVENTIONS:  -  Assess patient's ability to carry out ADLs; assess patient's baseline for ADL function and identify physical deficits which impact ability to perform ADLs (bathing, care of mouth/teeth, toileting, grooming, dressing, etc )  - Assess/evaluate cause of self-care deficits   - Assess range of motion  - Assess patient's mobility; develop plan if impaired  - Assess patient's need for assistive devices and provide as appropriate  - Encourage maximum independence but intervene and supervise when necessary  - Involve family in performance of ADLs  - Assess for home care needs following discharge   - Consider OT consult to assist with ADL evaluation and planning for discharge  - Provide patient education as appropriate  Outcome: Progressing  Goal: Maintain or return mobility status to optimal level  Description  INTERVENTIONS:  - Assess patient's baseline mobility status (ambulation, transfers, stairs, etc )    - Identify cognitive and physical deficits and behaviors that affect mobility  - Identify mobility aids required to assist with transfers and/or ambulation (gait belt, sit-to-stand, lift, walker, cane, etc )  - [place] fall precautions as indicated by assessment  - Record patient progress and toleration of activity level on Mobility SBAR; progress patient to next Phase/Stage  - Instruct patient to call for assistance with activity based on assessment  - Consider rehabilitation consult to assist with strengthening/weightbearing, etc   Outcome: Progressing     Problem: DISCHARGE PLANNING  Goal: Discharge to home or other facility with appropriate resources  Description  INTERVENTIONS:  - Identify barriers to discharge w/patient and caregiver  - Arrange for needed discharge resources and transportation as appropriate  - Identify discharge learning needs (meds, wound care, etc )  - Arrange for interpretive services to assist at discharge as needed  - Refer to Case Management Department for coordinating discharge planning if the patient needs post-hospital services based on physician/advanced practitioner order or complex needs related to functional status, cognitive ability, or social support system  Outcome: Progressing     Problem: Knowledge Deficit  Goal: Patient/family/caregiver demonstrates understanding of disease process, treatment plan, medications, and discharge instructions  Description  Complete learning assessment and assess knowledge base    Interventions:  - Provide teaching at level of understanding  - Provide teaching via preferred learning methods  Outcome: Progressing

## 2020-01-05 NOTE — ASSESSMENT & PLAN NOTE
· Intermittent lower abdominal pain with reported black stool  · Patient was seen at [facility] ER in June for same  · Hemoglobin 16 9  · Hemoccult stools  · Monitor hemoglobin  · Will give 1 dose of Protonix IV as patient is nauseous  · Will continue Protonix orally BID starting tomorrow

## 2020-01-05 NOTE — NURSING NOTE
Discharge instructions reviewed with patient  Patient verbalized understanding of same  IV sites removed

## 2020-01-05 NOTE — ASSESSMENT & PLAN NOTE
· Had EGD in June per old records:  Grade B erosive esophagitis and mild gastritis  · Continue Protonix  · Outpatient GI follow-up

## 2020-01-05 NOTE — H&P
[facility] Internal Medicine    H&P- Lorre Min 1985, 36 y o  male MRN: [id number]    Unit/Bed#: -01 Encounter: [id number]    Primary Care Provider: No primary care provider on file  Date and time admitted to hospital: 1/4/2020  6:09 PM        * Alcohol abuse with intoxication ([address] )  Assessment & Plan  · Presented with alcohol intoxication, ETOH:  371  · Per old records, patient has been seen before with ETOH levels in the 300s  · Drinks 1-2 pints of Crown Royal whiskey, states "I go on binges 3 times a year"  · UDS is negative  · Denies history withdrawal seizures  · Declines offer of rehab, stating fear of losing his job, "wanting to do it on his own"  · Suspect patient under reporting his alcohol use, as documentation in old records suggest daily use  · CIWA protocol  · Encourage and support alcohol cessation  · Case management consult      Lower abdominal pain and black stool  Assessment & Plan  · Intermittent lower abdominal pain with reported black stool  · Patient was seen at [facility] ER in June for same  · Hemoglobin 16 9  · Hemoccult stools  · Monitor hemoglobin  · Will give 1 dose of Protonix IV as patient is nauseous  · Will continue Protonix orally BID starting tomorrow    Colonic thickening  Assessment & Plan  · CT abdomen pelvis:  Diffuse mild thickening of the ascending, proximal, transverse and rectosigmoid colon which may be due to nonspecific colitis versus underdistention       · Suspect irritation due to alcohol use  · Encourage alcohol abstinence  · Outpatient GI consult    Anxiety and depression  Assessment & Plan  · Denies suicidal ideation  · Takes Ativan as an outpatient  · Continue outpatient follow-up      GERD (gastroesophageal reflux disease)  Assessment & Plan  · Had EGD in June per old records:  Grade B erosive esophagitis and mild gastritis  · Continue Protonix  · Outpatient GI follow-up      VTE Prophylaxis: Pharmacologic VTE Prophylaxis contraindicated due to Low risk  / reason for no mechanical VTE prophylaxis Low risk   Code Status:  Full  POLST: POLST is not applicable to this patient  Discussion with family:  Patient    Anticipated Length of Stay:  Patient will be admitted on an Inpatient basis with an anticipated length of stay of  greater than 2 midnights  Justification for Hospital Stay:  As described in plan above    Total Time for Visit, including Counseling / Coordination of Care: 30 minutes  Greater than 50% of this total time spent on direct patient counseling and coordination of care  Chief Complaint:   Alcohol intoxication    History of Present Illness:    Owen Slater is a 36 y o  male with history of alcohol abuse , GERD, who presents with abdominal pain and alcohol intoxication  He was very reluctant to come in, encouraged by a friend  The abdominal pain is described as severe, it was a gradual onset, is described as crampy and accompanied by nausea  He said he vomited several times, but it was not bloody  He also says he gets palpitations due to his anxiety and guilt about the situation   He has had the symptoms before  He believes it is worsened by alcohol  He has not noted any relieving factors  He said that he has never had an alcohol withdrawal seizure  Patient said that every time he started stating a new person, they want to go to the bar, and that his trigger  When asked how often he binge drinks, he did not give a clear answer  When asked to estimate a yearly amount of binges, he said about 3  According to review of old records, he has had alcohol levels of over 300 in the past, along with abdominal pain, melenic stools, and intractable hiccuping  He is calm and cooperative, in says he is just starting to get clarity about the situation  He feels shame over not having his child  He denies daily drinking, but says when he does drink it is excessive      Review of Systems:    Review of Systems   Constitutional: Negative for chills and fever  Eyes: Negative for visual disturbance  Respiratory: Negative for cough and shortness of breath  Cardiovascular: Positive for palpitations  Negative for chest pain and leg swelling  Gastrointestinal: Positive for abdominal pain, blood in stool, nausea and vomiting  Negative for abdominal distention, constipation and diarrhea  Genitourinary: Negative for dysuria and flank pain  Musculoskeletal: Negative for arthralgias and myalgias  Skin: Negative for pallor and rash  Neurological: Negative for dizziness, seizures, syncope, weakness, numbness and headaches  Psychiatric/Behavioral: The patient is nervous/anxious  All other systems reviewed and are negative  Past Medical and Surgical History:     Past Medical History:   Diagnosis Date    Alcohol abuse     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 Authorizing Provider   LORazepam (ATIVAN) 0 5 mg tablet TAKE 1 TAB BY MOUTH EVERY 8 HOURS AS NEEDED FOR ANXIETY (SEVERE ANXIETY)  5/20/19   Historical Provider, MD   ondansetron (ZOFRAN) 4 mg tablet Take 1 tablet (4 mg total) by mouth every 8 (eight) hours as needed for nausea or vomiting 6/28/19   Estephania Carrera MD     I have reviewed home medications with patient personally  Allergies:    Allergies   Allergen Reactions    Penicillins Hives       Social History:     Marital Status: Single   Occupation:    Patient Pre-hospital Living Situation:  Home  Patient Pre-hospital Level of Mobility:  Independent  Patient Pre-hospital Diet Restrictions:  None  Substance Use History:   Social History     Substance and Sexual Activity   Alcohol Use Yes    Frequency: Monthly or less    Drinks per session: 5 or 6    Binge frequency: Monthly Comment: daily     Social History     Tobacco Use   Smoking Status Never Smoker   Smokeless Tobacco Current User    Types: Chew     Social History     Substance and Sexual Activity   Drug Use Never       Family History:    Family History   Problem Relation Age of Onset    Dementia Mother     Pulmonary embolism Father        Physical Exam:     Vitals:   Blood Pressure: 157/92 (01/05/20 0650)  Pulse: 84 (01/05/20 0650)  Temperature: 98 1 °F (36 7 °C) (01/05/20 0650)  Respirations: 16 (01/05/20 0650)  Weight - Scale: 97 7 kg (215 lb 6 2 oz) (01/04/20 1812)  SpO2: 95 % (01/05/20 0700)    Physical Exam   Constitutional: He is oriented to person, place, and time  He appears well-developed and well-nourished  HENT:   Head: Normocephalic and atraumatic  Eyes: Pupils are equal, round, and reactive to light  Conjunctivae and EOM are normal    Neck: Normal range of motion  Neck supple  Cardiovascular: Normal rate, regular rhythm, normal heart sounds and intact distal pulses  Pulmonary/Chest: Effort normal and breath sounds normal    Abdominal: Soft  Bowel sounds are normal    Has hiccups   Musculoskeletal: Normal range of motion  Neurological: He is alert and oriented to person, place, and time  No tremors   Skin: Skin is warm and dry  Capillary refill takes less than 2 seconds  Nursing note and vitals reviewed  Additional Data:     Lab Results: I have personally reviewed pertinent reports        Results from last 7 days   Lab Units 01/04/20  1905   WBC Thousand/uL 7 15   HEMOGLOBIN g/dL 16 9   HEMATOCRIT % 46 9   PLATELETS Thousands/uL 214   NEUTROS PCT % 52   LYMPHS PCT % 38   MONOS PCT % 8   EOS PCT % 1     Results from last 7 days   Lab Units 01/05/20  0541 01/04/20  1905   SODIUM mmol/L 140 143   POTASSIUM mmol/L 3 8 3 8   CHLORIDE mmol/L 105 105   CO2 mmol/L 26 27   BUN mg/dL 8 10   CREATININE mg/dL 0 97 1 08   ANION GAP mmol/L 9 11   CALCIUM mg/dL 7 2* 7 7*   ALBUMIN g/dL  --  3 7   TOTAL BILIRUBIN mg/dL  --  0 70   ALK PHOS U/L  --  64   ALT U/L  --  48   AST U/L  --  42   GLUCOSE RANDOM mg/dL 82 95     Results from last 7 days   Lab Units 01/04/20  1905   INR  0 99             Results from last 7 days   Lab Units 01/05/20  0202 01/04/20  1905   LACTIC ACID mmol/L 1 8 2 4*       Imaging: I have personally reviewed pertinent reports  CT abdomen pelvis with contrast   Final Result by Tara Santana MD (01/04 2024)      Diffuse mild thickening of the ascending, proximal transverse, and rectosigmoid colon which may be due to nonspecific colitis versus underdistention  Workstation performed: [id number]         X-ray chest 2 views    (Results Pending)       EKG, Pathology, and Other Studies Reviewed on Admission:   EKG: NSR rate of 100, no ectopy or signs of ischemia or infarct  Allscripts / Epic Records Reviewed: Yes     ** Please Note: This note has been constructed using a voice recognition system   **

## 2020-01-05 NOTE — ASSESSMENT & PLAN NOTE
· Denies suicidal ideation  · Patient was on Ativan in the past and also on Zoloft    Discussed with the patient about following up with the primary care physician for further management  · Continue outpatient follow-up

## 2020-01-05 NOTE — ASSESSMENT & PLAN NOTE
· CT abdomen pelvis:  Diffuse mild thickening of the ascending, proximal, transverse and rectosigmoid colon which may be due to nonspecific colitis versus underdistention       · Suspect irritation due to alcohol use  · Encourage alcohol abstinence  · Outpatient GI consult

## 2020-01-05 NOTE — ASSESSMENT & PLAN NOTE
· Intermittent lower abdominal pain with reported black stool  · Patient was seen at [facility] ER in June for same  · Hemoglobin 16 9  · No further episodes in the hospital and no signs of active bleed  · Recommended to follow up with the Gastroenterology as an outpatient      · Continue with PPI  · Non ulcerogenic diet

## 2020-01-05 NOTE — PLAN OF CARE
Problem: PAIN - ADULT  Goal: Verbalizes/displays adequate comfort level or baseline comfort level  Description  Interventions:  - Encourage patient to monitor pain and request assistance  - Assess pain using appropriate pain scale  - Administer analgesics based on type and severity of pain and evaluate response  - Implement non-pharmacological measures as appropriate and evaluate response  - Consider cultural and social influences on pain and pain management  - Notify physician/advanced practitioner if interventions unsuccessful or patient reports new pain  Outcome: Progressing     Problem: INFECTION - ADULT  Goal: Absence or prevention of progression during hospitalization  Description  INTERVENTIONS:  - Assess and monitor for signs and symptoms of infection  - Monitor lab/diagnostic results  - Monitor all insertion sites, i e  indwelling lines, tubes, and drains  - Monitor endotracheal if appropriate and nasal secretions for changes in amount and color  - [place] appropriate cooling/warming therapies per order  - Administer medications as ordered  - Instruct and encourage patient and family to use good hand hygiene technique  - Identify and instruct in appropriate isolation precautions for identified infection/condition  Outcome: Progressing  Goal: Absence of fever/infection during neutropenic period  Description  INTERVENTIONS:  - Monitor WBC    Outcome: Progressing     Problem: SAFETY ADULT  Goal: Patient will remain free of falls  Description  INTERVENTIONS:  - Assess patient frequently for physical needs  -  Identify cognitive and physical deficits and behaviors that affect risk of falls    -  [place] fall precautions as indicated by assessment   - Educate patient/family on patient safety including physical limitations  - Instruct patient to call for assistance with activity based on assessment  - Modify environment to reduce risk of injury  - Consider OT/PT consult to assist with strengthening/mobility  Outcome: Progressing  Goal: Maintain or return to baseline ADL function  Description  INTERVENTIONS:  -  Assess patient's ability to carry out ADLs; assess patient's baseline for ADL function and identify physical deficits which impact ability to perform ADLs (bathing, care of mouth/teeth, toileting, grooming, dressing, etc )  - Assess/evaluate cause of self-care deficits   - Assess range of motion  - Assess patient's mobility; develop plan if impaired  - Assess patient's need for assistive devices and provide as appropriate  - Encourage maximum independence but intervene and supervise when necessary  - Involve family in performance of ADLs  - Assess for home care needs following discharge   - Consider OT consult to assist with ADL evaluation and planning for discharge  - Provide patient education as appropriate  Outcome: Progressing  Goal: Maintain or return mobility status to optimal level  Description  INTERVENTIONS:  - Assess patient's baseline mobility status (ambulation, transfers, stairs, etc )    - Identify cognitive and physical deficits and behaviors that affect mobility  - Identify mobility aids required to assist with transfers and/or ambulation (gait belt, sit-to-stand, lift, walker, cane, etc )  - [place] fall precautions as indicated by assessment  - Record patient progress and toleration of activity level on Mobility SBAR; progress patient to next Phase/Stage  - Instruct patient to call for assistance with activity based on assessment  - Consider rehabilitation consult to assist with strengthening/weightbearing, etc   Outcome: Progressing     Problem: DISCHARGE PLANNING  Goal: Discharge to home or other facility with appropriate resources  Description  INTERVENTIONS:  - Identify barriers to discharge w/patient and caregiver  - Arrange for needed discharge resources and transportation as appropriate  - Identify discharge learning needs (meds, wound care, etc )  - Arrange for interpretive services to assist at discharge as needed  - Refer to Case Management Department for coordinating discharge planning if the patient needs post-hospital services based on physician/advanced practitioner order or complex needs related to functional status, cognitive ability, or social support system  Outcome: Progressing     Problem: Knowledge Deficit  Goal: Patient/family/caregiver demonstrates understanding of disease process, treatment plan, medications, and discharge instructions  Description  Complete learning assessment and assess knowledge base    Interventions:  - Provide teaching at level of understanding  - Provide teaching via preferred learning methods  Outcome: Progressing     Problem: NEUROSENSORY - ADULT  Goal: Achieves stable or improved neurological status  Description  INTERVENTIONS  - Monitor and report changes in neurological status  - Monitor vital signs such as temperature, blood pressure, glucose, and any other labs ordered   - Initiate measures to prevent increased intracranial pressure  - Monitor for seizure activity and implement precautions if appropriate      Outcome: Progressing  Goal: Remains free of injury related to seizures activity  Description  INTERVENTIONS  - Maintain airway, patient safety  and administer oxygen as ordered  - Monitor patient for seizure activity, document and report duration and description of seizure to physician/advanced practitioner  - If seizure occurs,  ensure patient safety during seizure  - Reorient patient post seizure  - Seizure pads on all 4 side rails  - Instruct patient/family to notify RN of any seizure activity including if an aura is experienced  - Instruct patient/family to call for assistance with activity based on nursing assessment  - Administer anti-seizure medications if ordered    Outcome: Progressing

## 2020-01-05 NOTE — ED NOTES
Patient returned from [address] [place] [address]        Chapin Cardona RN  01/04/20 2030

## 2020-01-05 NOTE — ASSESSMENT & PLAN NOTE
· Presented with alcohol intoxication, ETOH:  371  · Per old records, patient has been seen before with ETOH levels in the 300s  · Drinks 1-2 pints of Crown [place] whiskey, states "I go on binges 3 times a year"  · UDS is negative  · Last CIWA protocol score was 3  · Patient blood alcohol level and is down to 15,  · Patient wants to go  Will discharge home with outpatient follow-up with the primary care physician  · Discussed with the patient about alcohol rehabilitation  Patient reported that he has outpatient resources and he will follow up with them as an outpatient    Also increase the patient to follow up with the primary care physician for management of is anxiety

## 2020-01-05 NOTE — ASSESSMENT & PLAN NOTE
· Presented with alcohol intoxication, ETOH:  371  · Per old records, patient has been seen before with ETOH levels in the 300s  · Drinks 1-2 pints of Crown Royal whiskey, states "I go on binges 3 times a year"  · UDS is negative  · Denies history withdrawal seizures  · Declines offer of rehab, stating fear of losing his job, "wanting to do it on his own"  · Suspect patient under reporting his alcohol use, as documentation in old records suggest daily use  · CIWA protocol  · Encourage and support alcohol cessation  · Case management consult

## 2020-01-05 NOTE — ASSESSMENT & PLAN NOTE
· Was seen at [facility] 06/2019 for melena   · Patient left AMA that visit  · Hemoglobin today 16 9  · Check stool Hemoccult  · Daily CBC and monitor hemoglobin  ·

## 2020-01-05 NOTE — PLAN OF CARE
Problem: PAIN - ADULT  Goal: Verbalizes/displays adequate comfort level or baseline comfort level  Description  Interventions:  - Encourage patient to monitor pain and request assistance  - Assess pain using appropriate pain scale  - Administer analgesics based on type and severity of pain and evaluate response  - Implement non-pharmacological measures as appropriate and evaluate response  - Consider cultural and social influences on pain and pain management  - Notify physician/advanced practitioner if interventions unsuccessful or patient reports new pain  1/5/2020 1613 by Gt Hardy RN  Outcome: Adequate for Discharge  1/5/2020 1608 by Gt Hardy RN  Outcome: Progressing     Problem: INFECTION - ADULT  Goal: Absence or prevention of progression during hospitalization  Description  INTERVENTIONS:  - Assess and monitor for signs and symptoms of infection  - Monitor lab/diagnostic results  - Monitor all insertion sites, i e  indwelling lines, tubes, and drains  - Monitor endotracheal if appropriate and nasal secretions for changes in amount and color  - [place] appropriate cooling/warming therapies per order  - Administer medications as ordered  - Instruct and encourage patient and family to use good hand hygiene technique  - Identify and instruct in appropriate isolation precautions for identified infection/condition  1/5/2020 1613 by Gt Hardy RN  Outcome: Adequate for Discharge  1/5/2020 1608 by Gt Hardy RN  Outcome: Progressing  Goal: Absence of fever/infection during neutropenic period  Description  INTERVENTIONS:  - Monitor WBC    1/5/2020 1613 by Gt Hardy RN  Outcome: Adequate for Discharge  1/5/2020 1608 by Gt Hardy RN  Outcome: Progressing     Problem: SAFETY ADULT  Goal: Patient will remain free of falls  Description  INTERVENTIONS:  - Assess patient frequently for physical needs  -  Identify cognitive and physical deficits and behaviors that affect risk of falls    -  [place] fall precautions as indicated by assessment   - Educate patient/family on patient safety including physical limitations  - Instruct patient to call for assistance with activity based on assessment  - Modify environment to reduce risk of injury  - Consider OT/PT consult to assist with strengthening/mobility  1/5/2020 1613 by Ericka Lozano RN  Outcome: Adequate for Discharge  1/5/2020 1608 by Ericka Lozano RN  Outcome: Progressing  Goal: Maintain or return to baseline ADL function  Description  INTERVENTIONS:  -  Assess patient's ability to carry out ADLs; assess patient's baseline for ADL function and identify physical deficits which impact ability to perform ADLs (bathing, care of mouth/teeth, toileting, grooming, dressing, etc )  - Assess/evaluate cause of self-care deficits   - Assess range of motion  - Assess patient's mobility; develop plan if impaired  - Assess patient's need for assistive devices and provide as appropriate  - Encourage maximum independence but intervene and supervise when necessary  - Involve family in performance of ADLs  - Assess for home care needs following discharge   - Consider OT consult to assist with ADL evaluation and planning for discharge  - Provide patient education as appropriate  1/5/2020 1613 by Ericka Lozano RN  Outcome: Adequate for Discharge  1/5/2020 1608 by Ericka Lozano RN  Outcome: Progressing  Goal: Maintain or return mobility status to optimal level  Description  INTERVENTIONS:  - Assess patient's baseline mobility status (ambulation, transfers, stairs, etc )    - Identify cognitive and physical deficits and behaviors that affect mobility  - Identify mobility aids required to assist with transfers and/or ambulation (gait belt, sit-to-stand, lift, walker, cane, etc )  - [place] fall precautions as indicated by assessment  - Record patient progress and toleration of activity level on Mobility SBAR; progress patient to next Phase/Stage  - Instruct patient to call for assistance with activity based on assessment  - Consider rehabilitation consult to assist with strengthening/weightbearing, etc   1/5/2020 1613 by Nette Bray RN  Outcome: Adequate for Discharge  1/5/2020 1608 by Nette Bray RN  Outcome: Progressing     Problem: DISCHARGE PLANNING  Goal: Discharge to home or other facility with appropriate resources  Description  INTERVENTIONS:  - Identify barriers to discharge w/patient and caregiver  - Arrange for needed discharge resources and transportation as appropriate  - Identify discharge learning needs (meds, wound care, etc )  - Arrange for interpretive services to assist at discharge as needed  - Refer to Case Management Department for coordinating discharge planning if the patient needs post-hospital services based on physician/advanced practitioner order or complex needs related to functional status, cognitive ability, or social support system  1/5/2020 1613 by Nette Bray RN  Outcome: Adequate for Discharge  1/5/2020 1608 by Nette Bray RN  Outcome: Progressing     Problem: Knowledge Deficit  Goal: Patient/family/caregiver demonstrates understanding of disease process, treatment plan, medications, and discharge instructions  Description  Complete learning assessment and assess knowledge base    Interventions:  - Provide teaching at level of understanding  - Provide teaching via preferred learning methods  1/5/2020 1613 by Nette Bray RN  Outcome: Adequate for Discharge  1/5/2020 1608 by Nette Bray RN  Outcome: Progressing     Problem: NEUROSENSORY - ADULT  Goal: Achieves stable or improved neurological status  Description  INTERVENTIONS  - Monitor and report changes in neurological status  - Monitor vital signs such as temperature, blood pressure, glucose, and any other labs ordered   - Initiate measures to prevent increased intracranial pressure  - Monitor for seizure activity and implement precautions if appropriate      1/5/2020 1613 by Nette Bray RN  Outcome: Adequate for Discharge  1/5/2020 1608 by Leopoldo Graham, RN  Outcome: Progressing  Goal: Remains free of injury related to seizures activity  Description  INTERVENTIONS  - Maintain airway, patient safety  and administer oxygen as ordered  - Monitor patient for seizure activity, document and report duration and description of seizure to physician/advanced practitioner  - If seizure occurs,  ensure patient safety during seizure  - Reorient patient post seizure  - Seizure pads on all 4 side rails  - Instruct patient/family to notify RN of any seizure activity including if an aura is experienced  - Instruct patient/family to call for assistance with activity based on nursing assessment  - Administer anti-seizure medications if ordered    1/5/2020 1613 by Leopoldo Graham, RN  Outcome: Adequate for Discharge  1/5/2020 1608 by Leopoldo Graham, RN  Outcome: Progressing

## 2020-01-05 NOTE — ASSESSMENT & PLAN NOTE
· Had EGD in June per old records:  Grade B erosive esophagitis and mild gastritis  · Continue omeprazole up as outpatient  · Outpatient GI follow-up

## 2020-01-05 NOTE — PLAN OF CARE
Problem: PAIN - ADULT  Goal: Verbalizes/displays adequate comfort level or baseline comfort level  Description  Interventions:  - Encourage patient to monitor pain and request assistance  - Assess pain using appropriate pain scale  - Administer analgesics based on type and severity of pain and evaluate response  - Implement non-pharmacological measures as appropriate and evaluate response  - Consider cultural and social influences on pain and pain management  - Notify physician/advanced practitioner if interventions unsuccessful or patient reports new pain  Outcome: Progressing     Problem: INFECTION - ADULT  Goal: Absence or prevention of progression during hospitalization  Description  INTERVENTIONS:  - Assess and monitor for signs and symptoms of infection  - Monitor lab/diagnostic results  - Monitor all insertion sites, i e  indwelling lines, tubes, and drains  - Monitor endotracheal if appropriate and nasal secretions for changes in amount and color  - [place] appropriate cooling/warming therapies per order  - Administer medications as ordered  - Instruct and encourage patient and family to use good hand hygiene technique  - Identify and instruct in appropriate isolation precautions for identified infection/condition  Outcome: Progressing  Goal: Absence of fever/infection during neutropenic period  Description  INTERVENTIONS:  - Monitor WBC    Outcome: Progressing     Problem: SAFETY ADULT  Goal: Patient will remain free of falls  Description  INTERVENTIONS:  - Assess patient frequently for physical needs  -  Identify cognitive and physical deficits and behaviors that affect risk of falls    -  [place] fall precautions as indicated by assessment   - Educate patient/family on patient safety including physical limitations  - Instruct patient to call for assistance with activity based on assessment  - Modify environment to reduce risk of injury  - Consider OT/PT consult to assist with strengthening/mobility  Outcome: Progressing  Goal: Maintain or return to baseline ADL function  Description  INTERVENTIONS:  -  Assess patient's ability to carry out ADLs; assess patient's baseline for ADL function and identify physical deficits which impact ability to perform ADLs (bathing, care of mouth/teeth, toileting, grooming, dressing, etc )  - Assess/evaluate cause of self-care deficits   - Assess range of motion  - Assess patient's mobility; develop plan if impaired  - Assess patient's need for assistive devices and provide as appropriate  - Encourage maximum independence but intervene and supervise when necessary  - Involve family in performance of ADLs  - Assess for home care needs following discharge   - Consider OT consult to assist with ADL evaluation and planning for discharge  - Provide patient education as appropriate  Outcome: Progressing  Goal: Maintain or return mobility status to optimal level  Description  INTERVENTIONS:  - Assess patient's baseline mobility status (ambulation, transfers, stairs, etc )    - Identify cognitive and physical deficits and behaviors that affect mobility  - Identify mobility aids required to assist with transfers and/or ambulation (gait belt, sit-to-stand, lift, walker, cane, etc )  - [place] fall precautions as indicated by assessment  - Record patient progress and toleration of activity level on Mobility SBAR; progress patient to next Phase/Stage  - Instruct patient to call for assistance with activity based on assessment  - Consider rehabilitation consult to assist with strengthening/weightbearing, etc   Outcome: Progressing     Problem: DISCHARGE PLANNING  Goal: Discharge to home or other facility with appropriate resources  Description  INTERVENTIONS:  - Identify barriers to discharge w/patient and caregiver  - Arrange for needed discharge resources and transportation as appropriate  - Identify discharge learning needs (meds, wound care, etc )  - Arrange for interpretive services to assist at discharge as needed  - Refer to Case Management Department for coordinating discharge planning if the patient needs post-hospital services based on physician/advanced practitioner order or complex needs related to functional status, cognitive ability, or social support system  Outcome: Progressing     Problem: Knowledge Deficit  Goal: Patient/family/caregiver demonstrates understanding of disease process, treatment plan, medications, and discharge instructions  Description  Complete learning assessment and assess knowledge base    Interventions:  - Provide teaching at level of understanding  - Provide teaching via preferred learning methods  Outcome: Progressing     Problem: NEUROSENSORY - ADULT  Goal: Achieves stable or improved neurological status  Description  INTERVENTIONS  - Monitor and report changes in neurological status  - Monitor vital signs such as temperature, blood pressure, glucose, and any other labs ordered   - Initiate measures to prevent increased intracranial pressure  - Monitor for seizure activity and implement precautions if appropriate      Outcome: Progressing  Goal: Remains free of injury related to seizures activity  Description  INTERVENTIONS  - Maintain airway, patient safety  and administer oxygen as ordered  - Monitor patient for seizure activity, document and report duration and description of seizure to physician/advanced practitioner  - If seizure occurs,  ensure patient safety during seizure  - Reorient patient post seizure  - Seizure pads on all 4 side rails  - Instruct patient/family to notify RN of any seizure activity including if an aura is experienced  - Instruct patient/family to call for assistance with activity based on nursing assessment  - Administer anti-seizure medications if ordered    Outcome: Progressing

## 2020-01-05 NOTE — DISCHARGE SUMMARY
Discharge- Santos Pickettlatesha 1985, 36 y o  male MRN: [id number]    Unit/Bed#: -01 Encounter: [id number]    Primary Care Provider: No primary care provider on file  Date and time admitted to hospital: 1/4/2020  6:09 PM        Lower abdominal pain and black stool  Assessment & Plan  · Intermittent lower abdominal pain with reported black stool  · Patient was seen at [facility] ER in June for same  · Hemoglobin 16 9  · No further episodes in the hospital and no signs of active bleed  · Recommended to follow up with the Gastroenterology as an outpatient  · Continue with PPI  · Non ulcerogenic diet    Colonic thickening  Assessment & Plan  · CT abdomen pelvis:  Diffuse mild thickening of the ascending, proximal, transverse and rectosigmoid colon which may be due to nonspecific colitis versus underdistention   · Likely secondary to under distension rather than colitis  No diarrhea  No abdominal pain  · Encourage alcohol abstinence  · Outpatient GI follow-up    Anxiety and depression  Assessment & Plan  · Denies suicidal ideation  · Patient was on Ativan in the past and also on Zoloft  Discussed with the patient about following up with the primary care physician for further management  · Continue outpatient follow-up      GERD (gastroesophageal reflux disease)  Assessment & Plan  · Had EGD in June per old records:  Grade B erosive esophagitis and mild gastritis  · Continue omeprazole up as outpatient  · Outpatient GI follow-up    * Alcohol abuse with intoxication ([facility] [address] )  Assessment & Plan  · Presented with alcohol intoxication, ETOH:  371  · Per old records, patient has been seen before with ETOH levels in the 300s  · Drinks 1-2 pints of [place] whiskey, states "I go on binges 3 times a year"  · UDS is negative  · Last CIWA protocol score was 3  · Patient blood alcohol level and is down to 15,  · Patient wants to go    Will discharge home with outpatient follow-up with the primary care physician  · Discussed with the patient about alcohol rehabilitation  Patient reported that he has outpatient resources and he will follow up with them as an outpatient  Also increase the patient to follow up with the primary care physician for management of is anxiety          Discharging Physician / Practitioner: Yolette Rose MD  PCP: No primary care provider on file  Admission Date:   Admission Orders (From admission, onward)     Ordered        01/04/20 2054  Inpatient Admission (expected length of stay for this patient Order details is greater than two midnights)  Once                   Discharge Date: 01/05/20    Resolved Problems  Date Reviewed: 1/5/2020    None          Consultations During Hospital Stay:  ·     Procedures Performed:   ·     Significant Findings / Test Results:   · CT abdomen and pelvis-diffuse mild thickening of the ascending proximal transverse and rectosigmoid colon which may be due to nonspecific colitis versus under distension  · Chest x-ray-no acute cardiopulmonary disease    Incidental Findings:   ·     Test Results Pending at Discharge (will require follow up):   ·      Outpatient Tests Requested:  ·     Complications:   none    Reason for Admission:  Alcohol intoxication    Hospital Course:     Susy Amaya is a 36 y o  male patient who originally presented to the hospital on 1/4/2020 due to alcohol intoxication  Patient was also having abdominal pain with nausea and vomiting  He was also admitted binge drinking  He reported that he binge drinks about 3 times in an ear and his blood alcohol level was about 300 at the time of presentation  Patient was admitted to the hospital for observation started on PPI given the history of abdominal pain and also intermittent dark stools  By reviewing the records it appears that the patient had history of alcohol abuse and admissions in the past for alcohol intoxication    Patient symptomatically improved and blood alcohol level came down to 15  Patient wanted to go home since he has to be be at his work  No signs of previous alcohol seizures  Discussed with the patient about outpatient follow-up with the primary care physician and also Gastroenterology  Recommended to encourage p o  Intake  Patient remained hemodynamically stable and afebrile  For details refer to the chart    The patient, initially admitted to the hospital as inpatient, was discharged earlier than expected given the following: Improvement in his condition  Please see above list of diagnoses and related plan for additional information  Condition at Discharge: good     Discharge Day Visit / Exam:     Subjective:  Patient seen and examined  Reported that his abdominal pain is improving able to tolerated diet  Patient wants to go home  Patient reported that he has outpatient resources available for alcohol rehab and he would like to follow-up as an outpatient for rehab  Discussed with the patient about alcohol abstinence  Patient reported that he only binge drinks  Discussed with the patient about contacting the primary care physician for managing his anxiety  Vitals: Blood Pressure: 161/97 (01/05/20 1506)  Pulse: 100 (01/05/20 1506)  Temperature: 97 8 °F (36 6 °C) (01/05/20 1506)  Temp Source: Oral (01/05/20 1506)  Respirations: 16 (01/05/20 1506)  Height: 6' 3" (190 5 cm) (01/05/20 0900)  Weight - Scale: 97 7 kg (215 lb 6 2 oz) (01/04/20 1812)  SpO2: 97 % (01/05/20 1506)  Exam:   Physical Exam   Constitutional: No distress  HENT:   Head: Normocephalic and atraumatic  Eyes: Right eye exhibits no discharge  Left eye exhibits no discharge  Neck: No JVD present  Cardiovascular: Normal rate  No murmur heard  Pulmonary/Chest: Effort normal  No respiratory distress  Abdominal: Soft  He exhibits no distension  Musculoskeletal: Normal range of motion  He exhibits no edema  Neurological: He is alert  No cranial nerve deficit   Coordination normal  Skin: Skin is warm  He is not diaphoretic  Discussion with Family:     Discharge instructions/Information to patient and family:   See after visit summary for information provided to patient and family  Provisions for Follow-Up Care:  See after visit summary for information related to follow-up care and any pertinent home health orders  Disposition:     Home    For Discharges to [facility]:   · Not Applicable to this Patient - Not Applicable to this Patient    Planned Readmission:  none     Discharge Statement:  I spent 45  minutes discharging the patient  This time was spent on the day of discharge  I had direct contact with the patient on the day of discharge  Greater than 50% of the total time was spent examining patient, answering all patient questions, arranging and discussing plan of care with patient as well as directly providing post-discharge instructions  Additional time then spent on discharge activities  Discharge Medications:  See after visit summary for reconciled discharge medications provided to patient and family        ** Please Note: This note has been constructed using a voice recognition system **

## 2020-01-06 LAB
ATRIAL RATE: 100 BPM
P AXIS: 71 DEGREES
PR INTERVAL: 150 MS
QRS AXIS: 75 DEGREES
QRSD INTERVAL: 90 MS
QT INTERVAL: 330 MS
QTC INTERVAL: 425 MS
T WAVE AXIS: 76 DEGREES
VENTRICULAR RATE: 100 BPM

## 2020-01-06 PROCEDURE — 93010 ELECTROCARDIOGRAM REPORT: CPT | Performed by: INTERNAL MEDICINE

## 2020-01-06 NOTE — UTILIZATION REVIEW
Initial Clinical Review    Admission: Date/Time/Statement: Inpatient Admission Orders (From admission, onward)     Ordered        01/04/20 2054  Inpatient Admission (expected length of stay for this patient Order details is greater than two midnights)  Once                   Orders Placed This Encounter   Procedures    Inpatient Admission (expected length of stay for this patient Order details is greater than two midnights)     Standing Status:   Standing     Number of Occurrences:   1     Order Specific Question:   Admitting Physician     Answer:   Ryan Bourgeois     Order Specific Question:   Level of Care     Answer:   Med Surg [16]     Order Specific Question:   Estimated length of stay     Answer:   More than 2 Midnights     Order Specific Question:   Certification     Answer:   I certify that inpatient services are medically necessary for this patient for a duration of greater than two midnights  See H&P and MD Progress Notes for additional information about the patient's course of treatment  ED Arrival Information     Expected Arrival Acuity Means of Arrival Escorted By Service Admission Type    - 1/4/2020 17:41 Urgent Walk-In Self Hospitalist Urgent    Arrival Complaint    alchohol        Chief Complaint   Patient presents with    Abdominal Pain     pt was drinking this day  pt with history of drinking issue  pt is with abdominal pain and discomfort  Pt does follow with GI and was last there in Nov     Assessment/Plan: 36year old male to the ED from home with complaints of abdominal pain blood in stools, 3-4 days of nausea  Admitted to inpatient for alcohol abuse with intoxication, lower abdominal pain and black stool, acute alcohol WD  He has been binge drinking for the past 7 days  ETOH on arrival is 371  H/O GI bleed  CIWA in ED is 19, he is nervous, anxious  Arrives diaphoretic, with abdomina pain, nausea, vomiting   Area of eccymosis on left lateral flank area from fall in the shower  Given IV Ativan in ED  Denies h/o WD seizures  Wants to rehab independently, declines assistance  Continue CIWA protocol  Check stools for blood  IV protonix  Hemoccult stools     ED Triage Vitals   Temperature Pulse Respirations Blood Pressure SpO2   01/05/20 0650 01/04/20 1812 01/04/20 1812 01/04/20 1812 01/04/20 1812   98 1 °F (36 7 °C) (!) 107 16 138/96 97 %      Temp Source Heart Rate Source Patient Position - Orthostatic VS BP Location FiO2 (%)   01/05/20 0900 01/04/20 1830 01/04/20 1830 01/04/20 1830 --   Oral Monitor Lying Right arm       Pain Score       01/05/20 0036       No Pain        Wt Readings from Last 1 Encounters:   01/04/20 97 7 kg (215 lb 6 2 oz)     Additional Vital Signs:   Date/Time  Temp  Pulse  Resp  BP  MAP (mmHg)  SpO2  O2 Device  Patient Position - Orthostatic VS   01/05/20 15:06:50  97 8 °F (36 6 °C)  100  16  161/97  118  97 %  None (Room air)  Lying   01/05/20 11:46:14    94  18  162/96  118  98 %       01/05/20 0700            95 %  None (Room air)     01/05/20 06:50:40  98 1 °F (36 7 °C)  84  16  157/92  114  97 %       01/05/20 0037              None (Room air)     01/04/20 2315    90        99 %       01/04/20 2300    90  16  131/75    96 %  None (Room air)  Lying   01/04/20 2100    100  21  136/94    99 %  None (Room air)  Lying   01/04/20 2015    97  18  133/92    97 %  None (Room air)  Sitting   01/04/20 1930    111Abnormal   20  136/97    99 %  None (Room air)  Lying   01/04/20 1900    98  20  135/99    98 %  None (Room air)  Lying   01/04/20 1830    102  18  137/97             Pertinent Labs/Diagnostic Test Results:   CT A/P 1/4:    Diffuse mild thickening of the ascending, proximal transverse, and rectosigmoid colon which may be due to nonspecific colitis versus underdistention            EKG:  Rhythm:     Rhythm: sinus rhythm    Ectopy:     Ectopy: none    QRS:     QRS axis:  Normal  Conduction:     Conduction: normal    ST segments:     ST segments:  Normal  T waves:     T waves: flattening and inverted      Flattening:   I    Inverted:  AVR and V1  Comments:      No STEMI  VA 150ms  QRS 90ms  QT 425ms  Results from last 7 days   Lab Units 01/04/20  1905   WBC Thousand/uL 7 15   HEMOGLOBIN g/dL 16 9   HEMATOCRIT % 46 9   PLATELETS Thousands/uL 214   NEUTROS ABS Thousands/µL 3 67         Results from last 7 days   Lab Units 01/05/20  0541 01/04/20  1905   SODIUM mmol/L 140 143   POTASSIUM mmol/L 3 8 3 8   CHLORIDE mmol/L 105 105   CO2 mmol/L 26 27   ANION GAP mmol/L 9 11   BUN mg/dL 8 10   CREATININE mg/dL 0 97 1 08   EGFR ml/min/1 73sq m 102 90   CALCIUM mg/dL 7 2* 7 7*   MAGNESIUM mg/dL  --  2 0     Results from last 7 days   Lab Units 01/04/20  1905   AST U/L 42   ALT U/L 48   ALK PHOS U/L 64   TOTAL PROTEIN g/dL 7 6   ALBUMIN g/dL 3 7   TOTAL BILIRUBIN mg/dL 0 70         Results from last 7 days   Lab Units 01/05/20  0541 01/04/20  1905   GLUCOSE RANDOM mg/dL 82 95     Results from last 7 days   Lab Units 01/04/20  1905   CK TOTAL U/L 361*   CK MB INDEX % <1 0   CK MB ng/mL 1 5     Results from last 7 days   Lab Units 01/04/20  2127 01/04/20  1905   TROPONIN I ng/mL <0 02 <0 02         Results from last 7 days   Lab Units 01/04/20  1905   PROTIME seconds 13 1   INR  0 99     Results from last 7 days   Lab Units 01/04/20  1905   TSH 3RD GENERATON uIU/mL 1 642         Results from last 7 days   Lab Units 01/05/20  0202 01/04/20  1905   LACTIC ACID mmol/L 1 8 2 4*     Results from last 7 days   Lab Units 01/04/20  1905   LIPASE u/L 243     Results from last 7 days   Lab Units 01/04/20  1932   CLARITY UA  Clear   COLOR UA  Yellow   SPEC GRAV UA  1 015   PH UA  8 0   GLUCOSE UA mg/dl Negative   KETONES UA mg/dl 15 (1+)*   BLOOD UA  Negative   PROTEIN UA mg/dl Negative   NITRITE UA  Negative   BILIRUBIN UA  Negative   UROBILINOGEN UA E U /dl 0 2   LEUKOCYTES UA  Negative         Results from last 7 days   Lab Units 01/04/20  4062 AMPH/METH  Negative   BARBITURATE UR  Negative   BENZODIAZEPINE UR  Negative   COCAINE UR  Negative   METHADONE URINE  Negative   OPIATE UR  Negative   PCP UR  Negative   THC UR  Negative     Results from last 7 days   Lab Units 01/05/20  1523 01/05/20  0541 01/04/20  1905   ETHANOL LVL mg/dL 15* 163* 371*       ED Treatment:   Medication Administration from 01/04/2020 1740 to 01/04/2020 2325       Date/Time Order Dose Route Action     01/04/2020 1924 morphine (PF) 4 mg/mL injection 4 mg 4 mg Intravenous Given     01/04/2020 1926 dextrose 5 % bolus 500 mL 500 mL Intravenous New Bag     01/04/2020 1926 thiamine (VITAMIN B1) 100 mg in sodium chloride 0 9 % 50 mL IVPB 100 mg Intravenous New Bag     66/23/6266 6611 folic acid 1 mg in sodium chloride 0 9 % 50 mL IVPB 1 mg Intravenous New Bag     01/04/2020 2023 magnesium sulfate 2 g/50 mL IVPB (premix) 2 g 2 g Intravenous New Bag     01/04/2020 2054 LORazepam (ATIVAN) 2 mg/mL injection 1 mg 1 mg Intravenous Given     01/04/2020 1922 pantoprazole (PROTONIX) injection 40 mg 40 mg Intravenous Given     01/04/2020 2031 sodium chloride 0 9 % bolus 1,000 mL 1,000 mL Intravenous New Bag     01/04/2020 2104 ondansetron (ZOFRAN) injection 4 mg 4 mg Intravenous Given     01/04/2020 2145 nicotine (NICODERM CQ) 14 mg/24hr TD 24 hr patch 14 mg 14 mg Transdermal Medication Applied        Past Medical History:   Diagnosis Date    Alcohol abuse     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    Known health problems: none      Admitting Diagnosis: Colitis [K52 9]  Nausea [R11 0]  Acute alcohol intoxication (HCC) [F10 929]  Elevated lactic acid level [R79 89]  Alcohol withdrawal syndrome without complication (HCC) [N48 292]  Left lower quadrant abdominal pain [R10 32]  Age/Sex: 36 y o  male  Admission Orders:  CIWA  Up and OOB  Tele  EKG     Scheduled Medications:  Folic acid, Multivitamin, protonix, thiamin, tylenol prn, aluminum magnesium hydroxide prn  ATIVAn IV prn  Nicotine patch  zofran prn  Continuous IV Infusions:  Sodium chloride 100 ml/hr  PRN Meds:    Network Utilization Review Department  [email or web address]  ATTENTION: Please call with any questions or concerns to [phone] and carefully listen to the prompts so that you are directed to the right person  All voicemails are confidential   Jeana Melara all requests for admission clinical reviews, approved or denied determinations and any other requests to dedicated fax number below belonging to the campus where the patient is receiving treatment   List of dedicated fax numbers for the Facilities:  [address] DENIALS (Administrative/Medical Necessity) [phone]   [address] (Maternity/NICU/Pediatrics) [phone]   Barbra Lopez [phone]     [address]  [phone]   Dung Moreno [phone]   Henny Strange [phone]   [address] [place] [address] [phone]   [facility]  [phone]   [address] [place] [address], [address]  [address] [facility] [address] [facility] [phone]

## 2020-01-06 NOTE — UTILIZATION REVIEW
Notification of Discharge  This is a Notification of Discharge from our facility [address]  Please be advised that this patient has been discharge from our facility  Below you will find the admission and discharge date and time including the patients disposition  PRESENTATION DATE: 1/4/2020  6:09 PM  OBS ADMISSION DATE:   IP ADMISSION DATE: 1/4/20 2054   DISCHARGE DATE: 1/5/2020  4:35 PM  DISPOSITION: Home/Self Care Home/Self Care   Admission Orders listed below:  Admission Orders (From admission, onward)     Ordered        01/04/20 2054  Inpatient Admission (expected length of stay for this patient Order details is greater than two midnights)  Once                   Please contact the UR Department if additional information is required to close this patient's authorization/case  [address] Utilization Review Department  Main: [phone] x carefully listen to the prompts  All voicemails are confidential   [email or web address]  Send all requests for admission clinical reviews, approved or denied determinations and any other requests to dedicated fax number below belonging to the campus where the patient is receiving treatment   List of dedicated fax numbers:  [address] DENIALS (Administrative/Medical Necessity) [phone]   [address] [place] [address] (Maternity/NICU/Pediatrics) [phone]   [facility] [phone]   Kanika Valverde [phone]   [facility] [phone]   [place] [address] [phone]   [facility]  [phone]   [address] [place] [address], [facility]  2401 [facility] [address] [facility] [phone]

## 2020-01-30 DIAGNOSIS — K21.9 GERD (GASTROESOPHAGEAL REFLUX DISEASE): ICD-10-CM

## 2020-01-30 RX ORDER — OMEPRAZOLE 40 MG/1
CAPSULE, DELAYED RELEASE ORAL
Qty: 30 CAPSULE | Refills: 0 | OUTPATIENT
Start: 2020-01-30
